# Patient Record
Sex: FEMALE | Race: OTHER | NOT HISPANIC OR LATINO
[De-identification: names, ages, dates, MRNs, and addresses within clinical notes are randomized per-mention and may not be internally consistent; named-entity substitution may affect disease eponyms.]

---

## 2024-04-16 PROBLEM — Z00.00 ENCOUNTER FOR PREVENTIVE HEALTH EXAMINATION: Status: ACTIVE | Noted: 2024-04-16

## 2024-04-18 ENCOUNTER — NON-APPOINTMENT (OUTPATIENT)
Age: 54
End: 2024-04-18

## 2024-04-18 ENCOUNTER — APPOINTMENT (OUTPATIENT)
Dept: THORACIC SURGERY | Facility: CLINIC | Age: 54
End: 2024-04-18
Payer: COMMERCIAL

## 2024-04-18 DIAGNOSIS — E78.5 HYPERLIPIDEMIA, UNSPECIFIED: ICD-10-CM

## 2024-04-18 DIAGNOSIS — Z82.49 FAMILY HISTORY OF ISCHEMIC HEART DISEASE AND OTHER DISEASES OF THE CIRCULATORY SYSTEM: ICD-10-CM

## 2024-04-18 DIAGNOSIS — Z80.0 FAMILY HISTORY OF MALIGNANT NEOPLASM OF DIGESTIVE ORGANS: ICD-10-CM

## 2024-04-18 DIAGNOSIS — G54.0 BRACHIAL PLEXUS DISORDERS: ICD-10-CM

## 2024-04-18 PROCEDURE — 99203 OFFICE O/P NEW LOW 30 MIN: CPT

## 2024-04-19 PROBLEM — Z82.49 FAMILY HISTORY OF MYOCARDIAL INFARCTION: Status: ACTIVE | Noted: 2024-04-19

## 2024-04-19 PROBLEM — E78.5 HLD (HYPERLIPIDEMIA): Status: ACTIVE | Noted: 2024-04-19

## 2024-04-19 PROBLEM — G54.0 TOS (THORACIC OUTLET SYNDROME): Status: ACTIVE | Noted: 2024-04-19

## 2024-04-19 PROBLEM — Z80.0 FAMILY HISTORY OF MALIGNANT NEOPLASM OF COLON: Status: ACTIVE | Noted: 2024-04-19

## 2024-04-19 RX ORDER — BACLOFEN 20 MG
100 TABLET ORAL DAILY
Refills: 0 | Status: ACTIVE | COMMUNITY

## 2024-04-19 RX ORDER — GLUCOSAM/CHONDRO/HERB 149/HYAL 750-100 MG
TABLET ORAL DAILY
Refills: 0 | Status: ACTIVE | COMMUNITY

## 2024-04-19 RX ORDER — CHLORHEXIDINE GLUCONATE 4 %
LIQUID (ML) TOPICAL DAILY
Refills: 0 | Status: ACTIVE | COMMUNITY

## 2024-04-20 NOTE — CONSULT LETTER
[Dear  ___] : Dear  [unfilled], [Consult Letter:] : I had the pleasure of evaluating your patient, [unfilled]. [Please see my note below.] : Please see my note below. [Consult Closing:] : Thank you very much for allowing me to participate in the care of this patient.  If you have any questions, please do not hesitate to contact me. [Sincerely,] : Sincerely, [FreeTextEntry2] : IRASEMA MORA MD [FreeTextEntry3] : Jose Elias Villalobos MD   Attending Thoracic Surgeon  Department of Cardiovascular and Thoracic Surgery   of Cardiothoracic Surgery  [employer] [facility], Division of Thoracic Surgery  [address] [id number]  Tel: [phone]  Fax: [phone]

## 2024-04-20 NOTE — HISTORY OF PRESENT ILLNESS
[FreeTextEntry1] : Patient is a 52 y/o, right-handed female, with a PMHx of RIGHT shoulder impingement syndrome and partial rotator cuff tear. She is status post right shoulder arthroscopy subacromial decompression, debridement, and acromioplasty with bursectomy on 12/14/21 with Dr. Moore. The surgery was indicated after the patient incurred a fall while walking and tripping over a pothole on 1/30/2023.   Since her surgery she underwent physical therapy and then injured her left arm in February 2022 while performing ROM exercises with cross-abduction. She has had cortisone injection to the L shoulder with minimal relief. She was found to have bone spurs at the AC joint. She underwent an arthroscopic procedure of the AC joint with clavicular resection on the left side on 1/30/23 with Dr. Moore.  After this procedure she experienced pain to her left arm with distal numbness/tingling of the last 2 digits of the left hand, left sided trapezius pain/ neck pain, and pain of the left shoulder with movement. She has tried PT and neuromuscular re-training with minimal improvement. She also had a special brace made in an [place] lab for scapula stability.   Most recent shoulder arthrogram did not show significant structural damage. She was seen by Dr. Fred Haile ([facility]) who did not think that the primary source of her pain was in the shoulder. Her recent ultrasound of the left brachial plexus demonstrated neuritis.   She is referred by Dr. Fortino Moore for evaluation of TOS.   Prior workup noted below.   IR intra articular contrast injection left shoulder, ultrasound guided 3/15/24: -Ultrasound of the left shoulder demonstrates no glenohumeral joint effusion  MRI shoulder 3/15/24: -Low grade to intermediate grade partial thickness articular surface tear of the mid supraspinatus tendon. No evidence of labral tear. Status post distal clavicle resection  Ultrasound brachial Plexus 2/15/24: -With dynamic shoulder abduction there is distention of the axillary vein which possibly contacts the medial and lateral cords, which maintains its normal appearance. Otherwise, no visualized abnormality of the brachial plexus. No disproportionate muscle atrophy.  -Scattered areas of the median and radial nerve neuritis. -Ulnar nerve subluxation with a low lying triceps, with otherwise normal appearance of the nerve.  EMG performed on 6/6/23:  No evidence of cervical radiculopathy, brachial plexopathy, thoracic outlet syndrome, or an ulnar neuropathy.   CXR 2/8/23: Previous resection of the distal clavicle again evident. Alignment is unchanged. No fracture is seen. Lungs clear.   MRI of the left brachial plexus 6/1/23: No evidence of a plexopathy or denervation effect of the ene musculature.  She reports intermittent swelling to the left shoulder with pain and tingling from her trapezius down the entirety of her arm. 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 She also reports experiencing instability of the left arm and asymmetry since the portion of the L clavicle was removed.

## 2024-04-20 NOTE — SOCIAL HISTORY
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

## 2024-04-20 NOTE — ASSESSMENT
[FreeTextEntry1] : Patient is a 53 year old, right-handed female, with a PMHx of RIGHT shoulder impingement syndrome and partial rotator cuff tear. She is status post RIGHT shoulder arthroscopy subacromial decompression, debridement, and acromioplasty with bursectomy on 12/14/21 with Dr. Moore.  Since her surgery she underwent physical therapy and then injured her left arm in February 2022 while performing ROM exercises with cross-abduction. She has had cortisone injections to the LEFT shoulder with minimal relief. She was found to have bone spurs at the AC joint. She underwent an arthroscopic procedure of the AC joint with clavicular resection on the LEFT side on 1/30/23 with Dr. Moore.   After this procedure she experienced shoulder instability, pain in her left arm with distal numbness/tingling of the last 2 digits of the left hand, left sided trapezius pain/ neck pain, and pain of the left shoulder with movement. She has tried PT and neuromuscular re-training with minimal improvement. She also had a special custom brace made to enhance scapular stability.   She is referred by Dr. Fortino Moore for evaluation of TOS. She presents to the office with the issues mentioned above. On physical exam, the symptoms in her left arm and hand were not immediately reproducible, but participation was limited. 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 She reports intermittent swelling to the left shoulder with pain and tingling from her trapezius down the entirety of her arm. The symptoms have impacted her ADLs and line of work. She also reports experiencing instability of the left arm and asymmetry since her LEFT shoulder surgery.  Her work up has been relatively unremarkable. Brachial plexus MR in June 2023 shows no evidence of plexopathy or denervation and her EMG study is also consistent with that. Cervical spine MR in May 2023 shows some degenerative disc disease but her EMG also shows no evidence of cervical radiculopathy. She has an ultrasound of the brachial plexus in February of this year and it was noted that the axillary vein is distended. There was ulnar nerve subluxation with a low-lying triceps with otherwise normal appearance of the nerve.   At this point, it is unclear if her symptoms are secondary to thoracic outlet syndrome or shoulder instability or possibly pectoralis minor syndrome. Her work up if significant for a distended axillary vein on ultrasound. It is unclear why it is distended based on all imaging and it may be due to either narrowing of the subclavian vein at the thoracic outlet or due to compression by the pec minor. Her shoulder and clavicle instability may be contributing to the narrowing of the thoracic outlet. The diagnosis and pathophysiology of thoracic outlet syndrome was discussed with the patient and I recommended further work up with a CT scan of the chest with IV contrast with arms abducted and adducted to evaluate the subclavian vein. If it is inconclusive, venography may be indicated. The patient understood the indications for the test.   All questions and concerns were addressed with the patient at the time of visit, who expressed understanding.  Plan: CT Chest with IV contrast- TOS protocol  RTC  I, Dr. Villalobos, personally performed the evaluation and management (E/M) services for this new patient. That E/M includes conducting the clinically appropriate initial history &/or exam, assessing all conditions, and establishing the plan of care. Today, my HENRY, [employer], was here to observe my evaluation and management service for this patient & follow plan of care established by me going forward.

## 2024-04-26 ENCOUNTER — RESULT REVIEW (OUTPATIENT)
Age: 54
End: 2024-04-26

## 2024-05-02 ENCOUNTER — NON-APPOINTMENT (OUTPATIENT)
Age: 54
End: 2024-05-02

## 2024-05-03 ENCOUNTER — APPOINTMENT (OUTPATIENT)
Dept: CT IMAGING | Facility: HOSPITAL | Age: 54
End: 2024-05-03

## 2024-05-06 ENCOUNTER — NON-APPOINTMENT (OUTPATIENT)
Age: 54
End: 2024-05-06

## 2024-05-07 ENCOUNTER — OUTPATIENT (OUTPATIENT)
Dept: OUTPATIENT SERVICES | Facility: HOSPITAL | Age: 54
LOS: 1 days | End: 2024-05-07
Payer: COMMERCIAL

## 2024-05-07 ENCOUNTER — APPOINTMENT (OUTPATIENT)
Dept: CT IMAGING | Facility: HOSPITAL | Age: 54
End: 2024-05-07

## 2024-05-07 LAB — POCT ISTAT CREATININE: 0.7 MG/DL — SIGNIFICANT CHANGE UP (ref 0.5–1.3)

## 2024-05-07 PROCEDURE — 82565 ASSAY OF CREATININE: CPT

## 2024-05-07 PROCEDURE — 71275 CT ANGIOGRAPHY CHEST: CPT | Mod: 26

## 2024-05-07 PROCEDURE — 71275 CT ANGIOGRAPHY CHEST: CPT

## 2024-05-09 ENCOUNTER — APPOINTMENT (OUTPATIENT)
Dept: THORACIC SURGERY | Facility: CLINIC | Age: 54
End: 2024-05-09
Payer: COMMERCIAL

## 2024-05-09 ENCOUNTER — TRANSCRIPTION ENCOUNTER (OUTPATIENT)
Age: 54
End: 2024-05-09

## 2024-05-09 VITALS
HEIGHT: 64 IN | BODY MASS INDEX: 25.27 KG/M2 | TEMPERATURE: 97.3 F | DIASTOLIC BLOOD PRESSURE: 79 MMHG | HEART RATE: 75 BPM | WEIGHT: 148 LBS | SYSTOLIC BLOOD PRESSURE: 125 MMHG | OXYGEN SATURATION: 98 %

## 2024-05-09 DIAGNOSIS — M79.2 NEURALGIA AND NEURITIS, UNSPECIFIED: ICD-10-CM

## 2024-05-09 PROCEDURE — 99213 OFFICE O/P EST LOW 20 MIN: CPT

## 2024-05-10 PROBLEM — M79.2 NEUROPATHIC PAIN OF LEFT SHOULDER: Status: ACTIVE | Noted: 2024-04-19

## 2024-05-10 NOTE — ASSESSMENT
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 Moore. She returns today to review her CT scan with IV contrast.   There appears to be no signs of arterial or venous thoracic outlet syndrome on the left side. She still has neurologic symptoms and despite her EMG studies being normal, neurogenic TOS or pectoralis minor syndrome affecting the brachial plexus may be a possible etiology of her symptoms. Discussed muscle blocks with botox for diagnostic purposes. Would recommend one muscle block at a time, in order to determine if either nTOS or PMS is the etiology of her symptoms. She is seeking an opinion with Dr. Bernardino Barillas at [facility] for an opinion and I will discuss further with him. Patient understood and agreed.   PLAN 1.  Recommended anterior scalene and pec minor block, will follow up with patient after consultation with Dr. Barillas.    I, Dr. Villalobos, personally performed the evaluation and management (E/M) services for this established patient who presents today with (a) new problem(s)/exacerbation of (an) existing condition(s). That E/M includes conducting the clinically appropriate interval history &/or exam, assessing all new/exacerbated conditions, and establishing a new plan of care. Today, my HENRY, [employer], was here to observe my evaluation and management service for this new problem/exacerbated condition and follow the plan of care established by me going forward.

## 2024-05-10 NOTE — PHYSICAL EXAM
[Abnormal Walk] : normal gait [Skin Color & Pigmentation] : normal skin color and pigmentation [Oriented To Time, Place, And Person] : oriented to person, place, and time [Impaired Insight] : insight and judgment were intact [Affect] : the affect was normal [FreeTextEntry1] : pain in left shoulder with distal numbness and tingling in the last 2 digits, tenderness to left anterior scalene and left chest tender with palpation

## 2024-05-10 NOTE — HISTORY OF PRESENT ILLNESS
[FreeTextEntry1] : Patient is a 54 year old, right-handed female, with a PMHx of RIGHT shoulder impingement syndrome and partial rotator cuff tear. She is status post RIGHT shoulder arthroscopy subacromial decompression, debridement, and acromioplasty with bursectomy on 12/14/21 with Dr. Moore.  Since her surgery she underwent physical therapy and then injured her left arm in February 2022 while performing ROM exercises with cross-abduction. She has had cortisone injections to the LEFT shoulder with minimal relief. She was found to have bone spurs at the AC joint. She underwent an arthroscopic procedure of the AC joint with clavicular resection on the LEFT side on 1/30/23 with Dr. Moore.  After this procedure she experienced shoulder instability, pain in her left arm with distal numbness/tingling of the last 2 digits of the left hand, left sided trapezius pain/ neck pain, and pain of the left shoulder with movement. She has tried PT and neuromuscular re-training with minimal improvement. She also had a special custom brace made to enhance scapular stability. She reports intermittent swelling to the left shoulder with pain and tingling from her trapezius down the entirety of her arm that has impacted her daily life. She was referred by Dr. Fortino Moore for evaluation of TOS.   Her work up at this point has been relatively unremarkable. Brachial plexus MR in June 2023 shows no evidence of plexopathy or denervation and her EMG study is also consistent with that. Cervical spine MR in May 2023 shows some degenerative disc disease but her EMG also shows no evidence of cervical radiculopathy. She has an ultrasound of the brachial plexus in February of this year and it was noted that the axillary vein is distended. There was ulnar nerve subluxation with a low-lying triceps with otherwise normal appearance of the nerve.  Unclear at this point if her symptoms are due to TOS. On physical exam, the symptoms in her left arm and hand were not immediately reproducible, but participation was limited. Her symptoms may be secondary to thoracic outlet syndrome or shoulder instability or possibly pectoralis minor syndrome.   She presents today after completing a CT scan of the chest to further evaluate the subclavian vein. If inconclusive venography will be the next step in work up. Of note, this test was completed inpatient due to the patient's contrast allergy.   Results are as follows:  CT Chest with IV contrast- TOS protocol 5/7/24:  1. No evidence of subclavian artery narrowing in adduction/abduction. No significant atherosclerotic changes. 2. Subclavian vein is less well visualized in the CTA examination however it appears grossly patent. 3. Thoracic aorta is of normal caliber. No significant mural atherosclerotic changes are demonstrated within the aorta or major side branches. 4. Tubular shaped nodule within the posterior basal segment left lower lobe with an average diameter of 6 mm. As per Fleischner society 2017 guidelines, imaging surveillance of this nodule in both low and high risk individuals at 6-12 months and again at 18-24 months to confirm stability is suggested.

## 2024-05-10 NOTE — DATA REVIEWED
[FreeTextEntry1] : IR intra articular contrast injection left shoulder, ultrasound guided 3/15/24: -Ultrasound of the left shoulder demonstrates no glenohumeral joint effusion  MRI shoulder 3/15/24: -Low grade to intermediate grade partial thickness articular surface tear of the mid supraspinatus tendon. No evidence of labral tear. Status post distal clavicle resection  Ultrasound brachial Plexus 2/15/24: -With dynamic shoulder abduction there is distention of the axillary vein which possibly contacts the medial and lateral cords, which maintains its normal appearance. Otherwise, no visualized abnormality of the brachial plexus. No disproportionate muscle atrophy. -Scattered areas of the median and radial nerve neuritis. -Ulnar nerve subluxation with a low lying triceps, with otherwise normal appearance of the nerve.  EMG performed on 6/6/23: No evidence of cervical radiculopathy, brachial plexopathy, thoracic outlet syndrome, or an ulnar neuropathy.  CXR 2/8/23: Previous resection of the distal clavicle again evident. Alignment is unchanged. No fracture is seen. Lungs clear.  MRI of the left brachial plexus 6/1/23: No evidence of a plexopathy or denervation effect of the ene musculature.